# Patient Record
(demographics unavailable — no encounter records)

---

## 2025-07-25 NOTE — HISTORY OF PRESENT ILLNESS
[Y] : Patient is sexually active [N] : Patient denies prior pregnancies [Currently Active] : currently active [Men] : men [Vaginal] : vaginal [FreeTextEntry1] : 29 yo G0 here for annual gyn visit Adrian IUD 7/2024 pt on multiple meds for migraines, also gets botox 4 days ago had some pain on the left side that was intense, had some nausea but no other symptoms.  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

## 2025-07-25 NOTE — DISCUSSION/SUMMARY
[FreeTextEntry1] : 28-year-old G0 here for annual GYN visit  Healthcare maintenance Pap collected.  Kyleena IUD in place, effective for 5 years  Pelvic pain: Transvaginal ultrasound performed by me at bedside today reveals bilateral corpus luteal cyst, reviewed with patient Kyleena IUD can lead to cyst formation.  Recommend NSAIDs with pain.  Kyleena IUD in correct position today.  Annual +8-minute consult not including ultrasound

## 2025-07-25 NOTE — PHYSICAL EXAM
[TextEntry] : General appearance well-appearing no acute distress  Breast examined in the upright and supine position.  Breast exam within normal limits, no masses no lymphadenopathy nontender bilaterally  Normal external genitalia  Speculum inserted normal-appearing vagina no lesions no abnormal discharge cervix appears closed no lesions. Pap collected, IUD strings seen at external os  Bimanual exam performed. Anteverted uterus nontender no cervical motion tenderness no adnexal masses bilaterally, no adnexal tenderness bilaterally   Transvaginal ultrasound performed by me at bedside revealed an anteverted uterus with IUD in correct position at uterine fundus, shadowing noted posteriorly.  Right ovary appears normal with corpus luteal cyst, positive color flow.  Left ovary also normal with corpus luteal cyst, positive color flow.  No free fluid.  No CMT elicited with transvaginal probe.